# Patient Record
Sex: MALE | Race: WHITE | NOT HISPANIC OR LATINO
[De-identification: names, ages, dates, MRNs, and addresses within clinical notes are randomized per-mention and may not be internally consistent; named-entity substitution may affect disease eponyms.]

---

## 2023-05-23 PROBLEM — Z87.898 HISTORY OF ALCOHOL USE DISORDER: Status: ACTIVE | Noted: 2020-12-15

## 2023-05-23 PROBLEM — E78.5 DYSLIPIDEMIA: Status: ACTIVE | Noted: 2020-08-05

## 2023-05-23 PROBLEM — F41.1 GENERALIZED ANXIETY DISORDER: Status: ACTIVE | Noted: 2020-09-11

## 2023-06-03 ENCOUNTER — HEALTH MAINTENANCE LETTER (OUTPATIENT)
Age: 48
End: 2023-06-03

## 2024-01-19 ENCOUNTER — OFFICE VISIT (OUTPATIENT)
Dept: FAMILY MEDICINE | Facility: CLINIC | Age: 49
End: 2024-01-19
Payer: COMMERCIAL

## 2024-01-19 VITALS
HEART RATE: 82 BPM | HEIGHT: 68 IN | OXYGEN SATURATION: 99 % | WEIGHT: 152 LBS | TEMPERATURE: 97.4 F | DIASTOLIC BLOOD PRESSURE: 72 MMHG | BODY MASS INDEX: 23.04 KG/M2 | SYSTOLIC BLOOD PRESSURE: 124 MMHG

## 2024-01-19 DIAGNOSIS — Z72.0 TOBACCO USE: ICD-10-CM

## 2024-01-19 DIAGNOSIS — Z11.4 SCREENING FOR HIV (HUMAN IMMUNODEFICIENCY VIRUS): ICD-10-CM

## 2024-01-19 DIAGNOSIS — Z12.11 SCREEN FOR COLON CANCER: ICD-10-CM

## 2024-01-19 DIAGNOSIS — B35.4 TINEA CORPORIS: Primary | ICD-10-CM

## 2024-01-19 LAB
BASOPHILS # BLD AUTO: 0.1 10E3/UL (ref 0–0.2)
BASOPHILS NFR BLD AUTO: 1 %
EOSINOPHIL # BLD AUTO: 0.2 10E3/UL (ref 0–0.7)
EOSINOPHIL NFR BLD AUTO: 3 %
ERYTHROCYTE [DISTWIDTH] IN BLOOD BY AUTOMATED COUNT: 12.3 % (ref 10–15)
HCT VFR BLD AUTO: 44 % (ref 40–53)
HGB BLD-MCNC: 14.7 G/DL (ref 13.3–17.7)
IMM GRANULOCYTES # BLD: 0 10E3/UL
IMM GRANULOCYTES NFR BLD: 0 %
LYMPHOCYTES # BLD AUTO: 1.5 10E3/UL (ref 0.8–5.3)
LYMPHOCYTES NFR BLD AUTO: 31 %
MCH RBC QN AUTO: 30.4 PG (ref 26.5–33)
MCHC RBC AUTO-ENTMCNC: 33.4 G/DL (ref 31.5–36.5)
MCV RBC AUTO: 91 FL (ref 78–100)
MONOCYTES # BLD AUTO: 0.4 10E3/UL (ref 0–1.3)
MONOCYTES NFR BLD AUTO: 7 %
NEUTROPHILS # BLD AUTO: 2.8 10E3/UL (ref 1.6–8.3)
NEUTROPHILS NFR BLD AUTO: 57 %
PLATELET # BLD AUTO: 233 10E3/UL (ref 150–450)
RBC # BLD AUTO: 4.84 10E6/UL (ref 4.4–5.9)
WBC # BLD AUTO: 4.9 10E3/UL (ref 4–11)

## 2024-01-19 PROCEDURE — 85025 COMPLETE CBC W/AUTO DIFF WBC: CPT | Performed by: FAMILY MEDICINE

## 2024-01-19 PROCEDURE — 36415 COLL VENOUS BLD VENIPUNCTURE: CPT | Performed by: FAMILY MEDICINE

## 2024-01-19 PROCEDURE — 87389 HIV-1 AG W/HIV-1&-2 AB AG IA: CPT | Performed by: FAMILY MEDICINE

## 2024-01-19 PROCEDURE — 80053 COMPREHEN METABOLIC PANEL: CPT | Performed by: FAMILY MEDICINE

## 2024-01-19 PROCEDURE — 99203 OFFICE O/P NEW LOW 30 MIN: CPT | Performed by: FAMILY MEDICINE

## 2024-01-19 RX ORDER — VARENICLINE TARTRATE 0.5 (11)-1
KIT ORAL
Qty: 53 TABLET | Refills: 0 | Status: SHIPPED | OUTPATIENT
Start: 2024-01-19

## 2024-01-19 RX ORDER — VARENICLINE TARTRATE 1 MG/1
1 TABLET, FILM COATED ORAL 2 TIMES DAILY
Qty: 60 TABLET | Refills: 3 | Status: SHIPPED | OUTPATIENT
Start: 2024-01-19

## 2024-01-19 NOTE — COMMUNITY RESOURCES LIST (ENGLISH)
01/19/2024   [facility]  N/A  For questions about this resource list or additional care needs, please contact your primary care clinic or care manager.  Phone: [phone]   Email: N/A   Address: [address] [place] [id number]   Hours: N/A        Food and Nutrition       Food pantry  1  [facility] - Food Shelf Distance: 1.96 miles      Pickup   [address] [place] [address]  Language: Khmer, English, Persian, Djiboutian, Estonian  Hours: Sun 11:00 AM - 1:30 PM  Fees: Free   Phone: (423) 140-8005 Email: icm@[email or web address].org Website: http://www.[email or web address]     2  [facility] Distance: 2.41 miles      In-Person   [address] [facility] [address] [place] [address]  Language: English  Hours: Tue 10:00 AM - 11:30 AM , Wed 5:00 PM - 6:30 PM , Fri 10:00 AM - 11:30 AM  Fees: Free   Phone: (517) 242-3975 Email: info@[employer].org Website: http://www.spln.org/     SNAP application assistance  3  [facility] -   Family Wellness (AIFW) Distance: 5.32 miles      In-Person, Phone/Virtual   [address] [place] [address]  Language: Khmer, Belarusian, English, Gujarati, Trevor, Thai, Persian, Georgian, Slovenian, Estonian  Hours: Mon - Wed 9:00 AM - 5:00 PM , Thu 12:00 PM - 6:00 PM , Fri 9:00 AM - 5:00 PM , Sun 10:30 AM - 2:00 PM Appt. Only  Fees: Free   Phone: (271) 536-4256 Email: info@[facility].org Website: https://www.sewa-aifw.org/     4  [facility] - SNAP Ed - SNAP Ed - SNAP application assistance Distance: 6.23 miles      In-Person   [address] [place] [address]  Language: English, Hmong, Oromo, Djiboutian, Croatian  Hours: Mon - Fri 9:00 AM - 5:00 PM Appt. Only  Fees: Free   Phone: (257) 533-4211 Email: mnext@[facility] Website: https://extension.[facility]/nutrition-education/snap-ed-educational-offerings     Soup kitchen or free meals  5  [facility] Community Supper Distance: 2.41 miles       In-Person   [address] [place] [address]  Language: English  Hours: Wed 5:15 PM - 6:00 PM  Fees: Free   Phone: (140) 191-1605 Email: info@[facility] Website: http://www.[email or web address].[employer]/     6  [facility] - Community Dinner Distance: 2.79 miles      Pickup   [address] [place] [address]  Language: English  Hours: Tue 5:00 PM - 6:30 PM  Fees: Free   Phone: (970) 493-5010 Email: admin@[employer] Website: http://www.[employer]/          Transportation       Free or low-cost transportation  7  [facility] Distance: 7.57 miles      In-Person   [address] [place] [address]  Language: English, Georgian  Hours: Mon 9:00 AM - 5:00 PM , Tue 9:30 AM - 7:00 PM , Wed 9:00 AM - 5:00 PM , Thu 9:30 AM - 7:00 PM , Fri 9:00 AM - 5:00 PM  Fees: Free   Phone: (160) 199-1224 Email: contactus@[employer] Website: http://www.[employer]     8  [facility] Distance: 7.79 miles      In-Person   [address] [place] [address]  Language: English  Hours: Mon - Wed 9:30 AM - 12:00 PM , Mon - Wed 1:00 PM - 2:00 PM Appt. Only  Fees: Free   Phone: (713) 397-4918 Email: info@[employer].org Website: http://www.[facility].org/     Transportation to medical appointments  9  [facility]   [facility] (AIFW) Distance: 5.32 miles      In-Person   [address] [place] [address]  Language: Macedonian, Swedish, English, Gujarati, Trevor, Bulgarian, Serbian, Telugu, Turkish, Bulgarian  Hours: Mon - Wed 9:00 AM - 5:00 PM , Thu 12:00 PM - 6:00 PM , Fri 9:00 AM - 5:00 PM , Sun 10:30 AM - 2:00 PM Appt. Only  Fees: Free   Phone: (893) 722-5447 Email: info@[employer].org Website: https://www.[employer].org/     10  [employer] [facility] Distance: 6.1 miles      In-Person   [address] [address]  Language: English  Hours: Mon - Thu 6:00 AM - 6:00 PM , Fri 6:00 AM - 5:00 PM  Fees: Insurance, Self Pay   Phone: (746) 204-8113 Email:  office@[employer] Website: https://www.[employer]/          Important Numbers & Websites       Emergency Services   911  City Services   311  Poison Control   (301) 184-4149  Suicide Prevention Lifeline   (671) 996-4653 (TALK)  Child Abuse Hotline   (301) 658-7517 (4-A-Child)  Sexual Assault Hotline   (569) 260-3549 (HOPE)  National Runaway Safeline   (825) 485-9709 (RUNAWAY)  All-Options Talkline   (546) 472-5273  Substance Abuse Referral   (860) 972-9870 (HELP)

## 2024-01-19 NOTE — PROGRESS NOTES
Assessment & Plan     Tinea corporis  Use Lamisil bid for 2 weeks.  - CBC with platelets and differential; Future  - Comprehensive metabolic panel (BMP + Alb, Alk Phos, ALT, AST, Total. Bili, TP); Future  - terbinafine (LAMISIL) 1 % external cream; Apply topically 2 times daily  - CBC with platelets and differential  - Comprehensive metabolic panel (BMP + Alb, Alk Phos, ALT, AST, Total. Bili, TP)    Screening for HIV (human immunodeficiency virus)  Screening  - HIV Antigen Antibody Combo; Future  - HIV Antigen Antibody Combo    Tobacco use  Would like to quite  March, 1, 2024, quite date.    - varenicline (CHANTIX TIKA) 0.5 MG X 11 & 1 MG X 42 tablet; Take 0.5 mg tab daily for 3 days, THEN 0.5 mg tab twice daily for 4 days, THEN 1 mg twice daily.  - varenicline (CHANTIX) 1 MG tablet; Take 1 tablet (1 mg) by mouth 2 times daily  - nicotine (NICORETTE) 2 MG gum; Place 1 each (2 mg) inside cheek every hour as needed for nicotine withdrawal symptoms    Screen for colon cancer  Screening.  - Colonoscopy Screening  Referral; Future    Patient reports December 23/2023, he felt stiffness in his right shoulder and neck area.  Then he had a small blister, fluid like blister was not tender, it was gone in 2 days.  2 days after that he felt that his lymph node under his right armpit was swollen, tender and that also was resolved.  During that time he has no fever, no chills.  He has no sore throat, no cough, he was not sick.    Now for the past 10 days he has a small rash to his medial right elbow, rash is itchy.    Patient denies being sick, he does smoke, he would like to quit.  He has no sore throat, no cough, no weight loss, denies being dizzy or lightheaded.  No abdominal pain , no nausea no vomiting.  no history of traveling, or sick contact.    Nicotine/Tobacco Cessation  He reports that he has been smoking cigarettes. He has been smoking an average of 0.5 packs per day. He has never used smokeless  "tobacco.  Nicotine/Tobacco Cessation Plan  Pharmacotherapies : varenicline (Chantix) and Nicotine gum      Work on weight loss  Regular exercise    Subjective   Rich is a 48 year old, presenting for the following health issues:  Rash   Patient reports December 23/2023, he felt stiffness in his right shoulder and neck area.  Then he had a small blister, fluid like blister was not tender, it was gone in 2 days.  2 days after that he felt that his lymph node under his right armpit was swollen, tender and that also was resolved.  During that time he has no fever, no chills.  He has no sore throat, no cough, he was not sick.    Now for the past 10 days he has a small rash to his medial right elbow, rash is itchy.    Patient denies being sick, he does smoke, he would like to quit.  He has no sore throat, no cough, no weight loss, denies being dizzy or lightheaded.  No abdominal pain , no nausea no vomiting.  no history of traveling, or sick contact.        Review of Systems  Constitutional, HEENT, cardiovascular, pulmonary, GI, , musculoskeletal, neuro, skin, endocrine and psych systems are negative, except as otherwise noted.      Objective    /72 (Patient Position: Sitting, Cuff Size: Adult Regular)   Pulse 82   Temp 97.4  F (36.3  C) (Oral)   Ht 1.727 m (5' 8\")   Wt 68.9 kg (152 lb)   SpO2 99%   BMI 23.11 kg/m    Body mass index is 23.11 kg/m .  Physical Exam   GENERAL: alert and no distress  HENT: ear canals and TM's normal, nose and mouth without ulcers or lesions  NECK: no adenopathy, no asymmetry, masses, or scars  RESP: lungs clear to auscultation - no rales, rhonchi or wheezes  CV: regular rate and rhythm, normal S1 S2, no S3 or S4, no murmur, click or rub, no peripheral edema  ABDOMEN: soft, nontender, no hepatosplenomegaly, no masses and bowel sounds normal  MS: no gross musculoskeletal defects noted, no edema  SKIN: xerosis -  right medial elbow, 3 1/2 cm by 3 1/2 cm circular rash, with rough " border and clear center.  BACK: no CVA tenderness, no paralumbar tenderness    LYMPH: no cervical, supraclavicular, axillary, or inguinal adenopathy    Orders Placed This Encounter   Procedures    REVIEW OF HEALTH MAINTENANCE PROTOCOL ORDERS    HIV Antigen Antibody Combo    Comprehensive metabolic panel (BMP + Alb, Alk Phos, ALT, AST, Total. Bili, TP)    CBC with platelets and differential    Colonoscopy Screening  Referral    CBC with platelets and differential            Signed Electronically by: Justin Winston MD

## 2024-01-20 LAB
ALBUMIN SERPL BCG-MCNC: 4.7 G/DL (ref 3.5–5.2)
ALP SERPL-CCNC: 46 U/L (ref 40–150)
ALT SERPL W P-5'-P-CCNC: 17 U/L (ref 0–70)
ANION GAP SERPL CALCULATED.3IONS-SCNC: 7 MMOL/L (ref 7–15)
AST SERPL W P-5'-P-CCNC: 19 U/L (ref 0–45)
BILIRUB SERPL-MCNC: 0.5 MG/DL
BUN SERPL-MCNC: 13.9 MG/DL (ref 6–20)
CALCIUM SERPL-MCNC: 9.5 MG/DL (ref 8.6–10)
CHLORIDE SERPL-SCNC: 101 MMOL/L (ref 98–107)
CREAT SERPL-MCNC: 0.71 MG/DL (ref 0.67–1.17)
DEPRECATED HCO3 PLAS-SCNC: 31 MMOL/L (ref 22–29)
EGFRCR SERPLBLD CKD-EPI 2021: >90 ML/MIN/1.73M2
GLUCOSE SERPL-MCNC: 91 MG/DL (ref 70–99)
HIV 1+2 AB+HIV1 P24 AG SERPL QL IA: NONREACTIVE
POTASSIUM SERPL-SCNC: 4.2 MMOL/L (ref 3.4–5.3)
PROT SERPL-MCNC: 7.1 G/DL (ref 6.4–8.3)
SODIUM SERPL-SCNC: 139 MMOL/L (ref 135–145)

## 2024-07-07 ENCOUNTER — HEALTH MAINTENANCE LETTER (OUTPATIENT)
Age: 49
End: 2024-07-07

## 2025-07-13 ENCOUNTER — HEALTH MAINTENANCE LETTER (OUTPATIENT)
Age: 50
End: 2025-07-13